# Patient Record
Sex: MALE | URBAN - NONMETROPOLITAN AREA
[De-identification: names, ages, dates, MRNs, and addresses within clinical notes are randomized per-mention and may not be internally consistent; named-entity substitution may affect disease eponyms.]

---

## 2017-09-18 ENCOUNTER — OFFICE VISIT (OUTPATIENT)
Dept: CARDIOLOGY | Facility: CLINIC | Age: 27
End: 2017-09-18
Payer: COMMERCIAL

## 2017-09-18 VITALS — HEART RATE: 90 BPM | DIASTOLIC BLOOD PRESSURE: 80 MMHG | SYSTOLIC BLOOD PRESSURE: 100 MMHG

## 2017-09-18 DIAGNOSIS — R00.0 TACHYCARDIA: Chronic | ICD-10-CM

## 2017-09-18 DIAGNOSIS — I45.6 WPW (WOLFF-PARKINSON-WHITE SYNDROME): Chronic | ICD-10-CM

## 2017-09-18 DIAGNOSIS — E78.5 DYSLIPIDEMIA: Chronic | ICD-10-CM

## 2017-09-18 PROCEDURE — 99203 OFFICE O/P NEW LOW 30 MIN: CPT | Performed by: INTERNAL MEDICINE

## 2017-09-18 RX ORDER — INSULIN GLARGINE 100 [IU]/ML
INJECTION, SOLUTION SUBCUTANEOUS NIGHTLY
COMMUNITY

## 2017-09-18 RX ORDER — ASPIRIN 81 MG/1
81 TABLET, CHEWABLE ORAL DAILY
COMMUNITY

## 2017-09-18 RX ORDER — MIRTAZAPINE 30 MG/1
30 TABLET, FILM COATED ORAL NIGHTLY
COMMUNITY

## 2017-09-18 RX ORDER — PREGABALIN 150 MG/1
150 CAPSULE ORAL DAILY
COMMUNITY

## 2017-09-18 RX ORDER — LISINOPRIL 20 MG/1
20 TABLET ORAL 2 TIMES DAILY
COMMUNITY

## 2017-09-18 ASSESSMENT — ENCOUNTER SYMPTOMS
BRUISES/BLEEDS EASILY: 0
CLAUDICATION: 0
FOCAL WEAKNESS: 0
CHILLS: 0
ABDOMINAL PAIN: 0
NAUSEA: 0
SHORTNESS OF BREATH: 0
WEAKNESS: 0
SORE THROAT: 0
PALPITATIONS: 1
PND: 0
FALLS: 0
DIZZINESS: 0
COUGH: 0
BLURRED VISION: 0
FEVER: 0

## 2017-09-18 NOTE — LETTER
[facility]   [address] [place] [address]  Phone: [phone]  Fax: [phone]              Angelo Mayorga  1990    Encounter Date: 9/18/2017    Nabeel Benoit M.D.          PROGRESS NOTE:  Subjective:   Angelo Mayorga is a 27 y.o. male who presents today in consulatation for [facility] for history of WPW with occasional palpitations.  He was informed he had WPW last year on multiple EKG's He denies history of syncope but has palpitations on occassions for about up to 10 seconds, possibly related to caffeine and possibly related to stress.  He is open to ablation for preventative therapy      Past Medical History:   Diagnosis Date   • WPW (America-Parkinson-White syndrome)      No past surgical history on file.  Family History   Problem Relation Age of Onset   • Heart Disease Neg Hx      History   Smoking Status   • Former Smoker   Smokeless Tobacco   • Never Used     Not on File  Outpatient Encounter Prescriptions as of 9/18/2017   Medication Sig Dispense Refill   • insulin glargine (LANTUS) 100 UNIT/ML Solution Inject  as instructed every evening.     • pregabalin (LYRICA) 150 MG Cap Take 150 mg by mouth every day.     • mirtazapine (REMERON) 30 MG Tab tablet Take 30 mg by mouth every evening.     • aspirin (ASA) 81 MG Chew Tab chewable tablet Take 81 mg by mouth every day.     • lisinopril (PRINIVIL) 20 MG Tab Take 20 mg by mouth 2 times a day.       No facility-administered encounter medications on file as of 9/18/2017.      Review of Systems   Constitutional: Negative for chills and fever.   HENT: Negative for sore throat.    Eyes: Negative for blurred vision.   Respiratory: Negative for cough and shortness of breath.    Cardiovascular: Positive for palpitations. Negative for chest pain, claudication, leg swelling and PND.   Gastrointestinal: Negative for abdominal pain and nausea.   Musculoskeletal: Negative for falls and joint  pain.   Skin: Negative for rash.   Neurological: Negative for dizziness, focal weakness and weakness.   Endo/Heme/Allergies: Does not bruise/bleed easily.        Objective:   /80   Pulse 90     Physical Exam   Constitutional: No distress.   HENT:   Mouth/Throat: Oropharynx is clear and moist.   Eyes: No scleral icterus.   Neck: Neck supple. No JVD present.   Cardiovascular: Normal rate, regular rhythm, normal heart sounds and intact distal pulses.  Exam reveals no gallop and no friction rub.    No murmur heard.  Pulmonary/Chest: Effort normal. He has no rales.   Abdominal: Soft. Bowel sounds are normal. There is no tenderness.   Musculoskeletal: He exhibits no edema.   Neurological: He is alert.   Skin: No rash noted. He is not diaphoretic.   Psychiatric: He has a normal mood and affect.     EKG of poor quality shows WPW    Labs reviewed HDL 38     Assessment:     1. WPW (America-Parkinson-White syndrome)  REFERRAL TO CARDIAC ELECTROPHYSIOLOGY   2. IDDM (insulin dependent diabetes mellitus) (CMS-[facility])     3. Tachycardia     4. Dyslipidemia         Medical Decision Making:  Today's Assessment / Status / Plan:     It was my pleasure to meet with Mr. Mayorga.    For his WPW he had never talked to cardiology or EP about ablation, given paroxysms of palpitations which seem consistent with WPW would refer to EP to discuss ablation.    It is my pleasure to participate in the care of Mr. Mayorga.  Please do not hesitate to contact me with questions or concerns.    Nabeel Benoit MD PhD FACC  Cardiologist [facility]        No Recipients

## 2017-09-18 NOTE — PROGRESS NOTES
Subjective:   Angelo Mayorga is a 27 y.o. male who [facility] of [facility] with occasional palpitations.  He was informed he had WPW last year on multiple EKG's He denies history of syncope but has palpitations on occassions for about up to 10 seconds, possibly related to caffeine and possibly related to stress.  He is open to ablation for preventative therapy      Past Medical History:   Diagnosis Date   • WPW (America-Parkinson-White syndrome)      No past surgical history on file.  Family History   Problem Relation Age of Onset   • Heart Disease Neg Hx      History   Smoking Status   • Former Smoker   Smokeless Tobacco   • Never Used     Not on File  Outpatient Encounter Prescriptions as of 9/18/2017   Medication Sig Dispense Refill   • insulin glargine (LANTUS) 100 UNIT/ML Solution Inject  as instructed every evening.     • pregabalin (LYRICA) 150 MG Cap Take 150 mg by mouth every day.     • mirtazapine (REMERON) 30 MG Tab tablet Take 30 mg by mouth every evening.     • aspirin (ASA) 81 MG Chew Tab chewable tablet Take 81 mg by mouth every day.     • lisinopril (PRINIVIL) 20 MG Tab Take 20 mg by mouth 2 times a day.       No facility-administered encounter medications on file as of 9/18/2017.      Review of Systems   Constitutional: Negative for chills and fever.   HENT: Negative for sore throat.    Eyes: Negative for blurred vision.   Respiratory: Negative for cough and shortness of breath.    Cardiovascular: Positive for palpitations. Negative for chest pain, claudication, leg swelling and PND.   Gastrointestinal: Negative for abdominal pain and nausea.   Musculoskeletal: Negative for falls and joint pain.   Skin: Negative for rash.   Neurological: Negative for dizziness, focal weakness and weakness.   Endo/Heme/Allergies: Does not bruise/bleed easily.        Objective:   /80   Pulse 90     Physical Exam   Constitutional: No distress.   HENT:   Mouth/Throat: Oropharynx is  clear and moist.   Eyes: No scleral icterus.   Neck: Neck supple. No JVD present.   Cardiovascular: Normal rate, regular rhythm, normal heart sounds and intact distal pulses.  Exam reveals no gallop and no friction rub.    No murmur heard.  Pulmonary/Chest: Effort normal. He has no rales.   Abdominal: Soft. Bowel sounds are normal. There is no tenderness.   Musculoskeletal: He exhibits no edema.   Neurological: He is alert.   Skin: No rash noted. He is not diaphoretic.   Psychiatric: He has a normal mood and affect.     EKG of poor quality shows WPW    Labs reviewed HDL 38     Assessment:     1. WPW (America-Parkinson-White syndrome)  REFERRAL TO CARDIAC ELECTROPHYSIOLOGY   2. IDDM (insulin dependent diabetes mellitus) (CMS-[facility])     3. Tachycardia     4. Dyslipidemia         Medical Decision Making:  Today's Assessment / Status / Plan:     It was my pleasure to meet with Mr. Mayorga.    For his WPW he had never talked to cardiology or EP about ablation, given paroxysms of palpitations which seem consistent with WPW would refer to EP to discuss ablation.    It is my pleasure to participate in the care of Mr. Mayorga.  Please do not hesitate to contact me with questions or concerns.    Nabeel Benoit MD PhD [facility]  Cardiologist [facility]